# Patient Record
Sex: MALE | Race: OTHER | NOT HISPANIC OR LATINO | URBAN - METROPOLITAN AREA
[De-identification: names, ages, dates, MRNs, and addresses within clinical notes are randomized per-mention and may not be internally consistent; named-entity substitution may affect disease eponyms.]

---

## 2023-12-28 ENCOUNTER — EMERGENCY (EMERGENCY)
Facility: HOSPITAL | Age: 78
LOS: 1 days | Discharge: HOME | End: 2023-12-28
Attending: EMERGENCY MEDICINE
Payer: SELF-PAY

## 2023-12-28 VITALS
DIASTOLIC BLOOD PRESSURE: 75 MMHG | TEMPERATURE: 99 F | HEART RATE: 72 BPM | OXYGEN SATURATION: 96 % | RESPIRATION RATE: 18 BRPM | SYSTOLIC BLOOD PRESSURE: 121 MMHG

## 2023-12-28 PROCEDURE — 70450 CT HEAD/BRAIN W/O DYE: CPT | Mod: 26,MA

## 2023-12-28 PROCEDURE — 71046 X-RAY EXAM CHEST 2 VIEWS: CPT

## 2023-12-28 PROCEDURE — 99284 EMERGENCY DEPT VISIT MOD MDM: CPT | Mod: 25

## 2023-12-28 PROCEDURE — 99284 EMERGENCY DEPT VISIT MOD MDM: CPT

## 2023-12-28 PROCEDURE — 70450 CT HEAD/BRAIN W/O DYE: CPT | Mod: MA

## 2023-12-28 PROCEDURE — 71046 X-RAY EXAM CHEST 2 VIEWS: CPT | Mod: 26

## 2023-12-28 PROCEDURE — T1013: CPT

## 2023-12-28 RX ORDER — ACETAMINOPHEN 500 MG
650 TABLET ORAL ONCE
Refills: 0 | Status: COMPLETED | OUTPATIENT
Start: 2023-12-28 | End: 2023-12-28

## 2023-12-28 RX ADMIN — Medication 650 MILLIGRAM(S): at 16:30
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
## 2023-12-28 NOTE — ED ADULT NURSE NOTE - ED STAT RN HANDOFF DETAILS
Patient discharged home as per MD order, Interpretor services used Mandarin #[id number] to discharged patient. patient verbalizes understanding leaving ambulatory in no acute distress. Patient discharged home as per MD order, Interpretor services used Mandarin #[id number] to discharged patient. patient verbalizes understanding leaving ambulatory in no acute distress.
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
## 2023-12-28 NOTE — ED ADULT NURSE NOTE - NSFALLUNIVINTERV_ED_ALL_ED
Bed/Stretcher in lowest position, wheels locked, appropriate side rails in place/Call bell, personal items and telephone in reach/Instruct patient to call for assistance before getting out of bed/chair/stretcher/Non-slip footwear applied when patient is off stretcher/[place] to call system/Physically safe environment - no spills, clutter or unnecessary equipment/Purposeful proactive rounding/Room/bathroom lighting operational, light cord in reach Bed/Stretcher in lowest position, wheels locked, appropriate side rails in place/Call bell, personal items and telephone in reach/Instruct patient to call for assistance before getting out of bed/chair/stretcher/Non-slip footwear applied when patient is off stretcher/[place] to call system/Physically safe environment - no spills, clutter or unnecessary equipment/Purposeful proactive rounding/Room/bathroom lighting operational, light cord in reach

## 2023-12-28 NOTE — ED ADULT NURSE NOTE - CAS EDN DISCHARGE ASSESSMENT
Detail Level: Detailed
Add 05579 Cpt? (Important Note: In 2017 The Use Of 40039 Is Being Tracked By Cms To Determine Future Global Period Reimbursement For Global Periods): yes
Alert and oriented to person, place and time

## 2023-12-28 NOTE — ED PROVIDER NOTE - NSFOLLOWUPINSTRUCTIONS_ED_ALL_ED_FT
Please follow up with your primary care doctor in 1-2 days.  Please use acetaminophen as needed for pain.  Please keep well hydrated.  Please return to the emergency department if you have worsening pain, dizziness, chest pain, shortness of breath, vomiting, fever, or any other symptoms.    Head Injury, Adult    There are many types of head injuries. They can be as minor as a small bump. Some head injuries can be worse. Worse injuries include:  •A strong hit to the head that shakes the brain back and forth, causing damage (concussion).  •A bruise (contusion) of the brain. This means there is bleeding in the brain that can cause swelling.  •A cracked skull (skull fracture).  •Bleeding in the brain that gathers, gets thick (makes a clot), and forms a bump (hematoma).    Most problems from a head injury come in the first 24 hours. However, you may still have side effects up to 7–10 days after your injury. It is important to watch your condition for any changes. You may need to be watched in the emergency department or urgent care, or you may need to stay in the hospital.    What are the causes?  There are many possible causes of a head injury. A serious head injury may be caused by:  •A car accident.  •Bicycle or motorcycle accidents.  •Sports injuries.  •Falls.  •Being hit by an object.    What are the signs or symptoms?  Symptoms of a head injury include a bruise, bump, or bleeding where the injury happened. Other physical symptoms may include:  •Headache.  •Feeling like you may vomit (nauseous) or vomiting.  •Dizziness.  •Blurred or double vision.  •Being uncomfortable around bright lights or loud noises.  •Shaking movements that you cannot control (seizures).  •Feeling tired.  •Trouble being woken up.  •Fainting or loss of consciousness.    Mental or emotional symptoms may include:  •Feeling grumpy or cranky.  •Confusion and memory problems.  •Having trouble paying attention or concentrating.  •Changes in eating or sleeping habits.  •Feeling worried or nervous (anxious).  •Feeling sad (depressed).    How is this treated?    Treatment for this condition depends on how severe the injury is and the type of injury you have. The main goal is to prevent problems and to allow the brain time to heal.    Mild head injury   If you have a mild head injury, you may be sent home, and treatment may include:  •Being watched. A responsible adult should stay with you for 24 hours after your injury and check on you often.  •Physical rest.  •Brain rest.  •Pain medicines.    Severe head injury  If you have a severe head injury, treatment may include:  •Being watched closely. This includes staying in the hospital.  •Medicines to:  •Help with pain.  •Prevent seizures.  •Help with brain swelling.  •Protecting your airway and using a machine that helps you breathe (ventilator).  •Treatments to watch for and manage swelling inside the brain.  •Brain surgery. This may be needed to:  •Remove a collection of blood or blood clots.  •Stop the bleeding.  •Remove a part of the skull. This allows room for the brain to swell.    Follow these instructions at home:    Activity   •Rest.  •Avoid activities that are hard or tiring.  •Make sure you get enough sleep.  •Let your brain rest. Do this by limiting activities that need a lot of thought or attention, such as:  •Watching TV.  •Playing memory games and puzzles.  •Job-related work or homework.  •Working on the computer, social media, and texting.  •Avoid activities that could cause another head injury until your doctor says it is okay. This includes playing sports. Having another head injury, especially before the first one has healed, can be dangerous.  •Ask your doctor when it is safe for you to go back to your normal activities, such as work or school. Ask your doctor for a step-by-step plan for slowly going back to your normal activities.  •Ask your doctor when you can drive, ride a bicycle, or use heavy machinery. Do not do these activities if you are dizzy.    Lifestyle   • Do not drink alcohol until your doctor says it is okay.  • Do not use drugs.  •If it is harder than usual to remember things, write them down.  •If you are easily distracted, try to do one thing at a time.  •Talk with family members or close friends when making important decisions.  •Tell your friends, family, a trusted co-worker, and  about your injury, symptoms, and limits (restrictions). Have them watch for any problems that are new or getting worse.    General instructions   •Take over-the-counter and prescription medicines only as told by your doctor.  •Have someone stay with you for 24 hours after your head injury. This person should watch you for any changes in your symptoms and be ready to get help.  •Keep all follow-up visits as told by your doctor. This is important.    How is this prevented?   •Work on your balance and strength. This can help you avoid falls.  •Wear a seat belt when you are in a moving vehicle.  •Wear a helmet when you:  •Ride a bicycle.  •Ski.  •Do any other sport or activity that has a risk of injury.  •If you drink alcohol:•Limit how much you use to:  •0–1 drink a day for nonpregnant women.  •0–2 drinks a day for men.  •Be aware of how much alcohol is in your drink. In the [place] one drink equals one 12 oz bottle of beer (355 mL), one 5 oz glass of wine (148 mL), or one 1½ oz glass of hard liquor (44 mL).    •Make your home safer by:  •Getting rid of clutter from the floors and stairs. This includes things that can make you trip.  •Using grab bars in bathrooms and handrails by stairs.  •Placing non-slip mats on floors and in bathtubs.  •Putting more light in dim areas.    Where to find more information  •Centers for Disease Control and Prevention: www.cdc.gov    Get help right away if:  •You have:  •A very bad headache that is not helped by medicine.  •Trouble walking or weakness in your arms and legs.  •Clear or bloody fluid coming from your nose or ears.  •Changes in how you see (vision).  •A seizure.  •More confusion or more grumpy moods.  •Your symptoms get worse.  •You are sleepier than normal and have trouble staying awake.  •You lose your balance.  •The black centers of your eyes (pupils) change in size.  •Your speech is slurred.  •Your dizziness gets worse.  •You vomit.    These symptoms may be an emergency. Do not wait to see if the symptoms will go away. Get medical help right away. Call your local emergency services (911 in the [place] Do not drive yourself to the hospital.     Summary  •Head injuries can be as minor as a small bump. Some head injuries can be worse.  •Treatment for this condition depends on how severe the injury is and the type of injury you have.  •Have someone stay with you for 24 hours after your head injury.  •Ask your doctor when it is safe for you to go back to your normal activities, such as work or school.  •To prevent a head injury, wear a seat belt in a car, wear a helmet when you use a bicycle, limit your alcohol use, and make your home safer.    This information is not intended to replace advice given to you by your health care provider. Make sure you discuss any questions you have with your health care provider.

## 2023-12-28 NOTE — ED ADULT NURSE NOTE - CHPI ED NUR SYMPTOMS NEG
no acting out behaviors/no back pain/no bruising/no crying/no decreased eating/drinking/no difficulty bearing weight/no laceration/no loss of consciousness/no sleeping issues

## 2023-12-28 NOTE — ED PROVIDER NOTE - WR INTERPRETATION 1
I did not personally see the patient but I have reviewed and agree with the assessment and plan as documented in this note.  Shell Deras, PhD -- Supervisor   Licensed Psychologist        CXR negative - No pneumothorax, No opacities, No free air

## 2023-12-28 NOTE — ED PROVIDER NOTE - PATIENT PORTAL LINK FT
You can access the FollowMyHealth Patient Portal offered by [facility] by registering at the following website: [email or web address]. By joining [facility]’s FollowMyHealth portal, you will also be able to view your health information using other applications (apps) compatible with our system. You can access the FollowMyHealth Patient Portal offered by [facility] by registering at the following website: [email or web address]. By joining [facility]’s FollowMyHealth portal, you will also be able to view your health information using other applications (apps) compatible with our system.

## 2023-12-28 NOTE — ED PROVIDER NOTE - CLINICAL SUMMARY MEDICAL DECISION MAKING FREE TEXT BOX
Patient with regular rate and rhythm at this time on exam. Symptoms resolved. No chest pain/shortness of breath or evidence of chest trauma, and chest x-ray ____________. Exam and character low suspicion for significant head trauma, and CT head _____. No e/o trauma below the head. Given Tylenol. Patient with regular rate and rhythm at this time on exam. Symptoms resolved. No chest pain/shortness of breath or evidence of chest trauma, and chest x-ray negative. Exam and character low suspicion for significant head trauma, and CT head _____. No e/o trauma below the head. Given Tylenol. Patient is well appearing, NAD, afebrile, hemodynamically stable, walking around ED without issues. Any available tests and studies were discussed with patient and family. Discharged with instructions in further symptomatic care, return precautions, and need for PMD f/u.